# Patient Record
Sex: MALE | Race: WHITE | NOT HISPANIC OR LATINO | Employment: FULL TIME | URBAN - METROPOLITAN AREA
[De-identification: names, ages, dates, MRNs, and addresses within clinical notes are randomized per-mention and may not be internally consistent; named-entity substitution may affect disease eponyms.]

---

## 2020-03-05 ENCOUNTER — TRANSFERRED RECORDS (OUTPATIENT)
Dept: HEALTH INFORMATION MANAGEMENT | Facility: CLINIC | Age: 65
End: 2020-03-05

## 2021-12-17 ENCOUNTER — MEDICAL CORRESPONDENCE (OUTPATIENT)
Dept: HEALTH INFORMATION MANAGEMENT | Facility: CLINIC | Age: 66
End: 2021-12-17
Payer: COMMERCIAL

## 2021-12-17 ENCOUNTER — TRANSFERRED RECORDS (OUTPATIENT)
Dept: HEALTH INFORMATION MANAGEMENT | Facility: CLINIC | Age: 66
End: 2021-12-17
Payer: COMMERCIAL

## 2021-12-20 ENCOUNTER — TRANSCRIBE ORDERS (OUTPATIENT)
Dept: OTHER | Age: 66
End: 2021-12-20
Payer: COMMERCIAL

## 2021-12-20 DIAGNOSIS — Z98.890 HISTORY OF PTERYGIUM EXCISION: ICD-10-CM

## 2021-12-20 DIAGNOSIS — H53.2 BINOCULAR VISION DISORDER WITH DIPLOPIA: Primary | ICD-10-CM

## 2022-02-07 ENCOUNTER — OFFICE VISIT (OUTPATIENT)
Dept: OPHTHALMOLOGY | Facility: CLINIC | Age: 67
End: 2022-02-07
Attending: OPHTHALMOLOGY
Payer: COMMERCIAL

## 2022-02-07 DIAGNOSIS — H49.21 6TH NERVE PALSY, RIGHT: Primary | ICD-10-CM

## 2022-02-07 DIAGNOSIS — H53.10 SUBJECTIVE VISUAL DISTURBANCE: Primary | ICD-10-CM

## 2022-02-07 DIAGNOSIS — H53.2 BINOCULAR VISION DISORDER WITH DIPLOPIA: ICD-10-CM

## 2022-02-07 DIAGNOSIS — Z98.890 HISTORY OF PTERYGIUM EXCISION: ICD-10-CM

## 2022-02-07 PROCEDURE — G0463 HOSPITAL OUTPT CLINIC VISIT: HCPCS | Performed by: TECHNICIAN/TECHNOLOGIST

## 2022-02-07 PROCEDURE — 92060 SENSORIMOTOR EXAMINATION: CPT | Performed by: OPHTHALMOLOGY

## 2022-02-07 PROCEDURE — 99204 OFFICE O/P NEW MOD 45 MIN: CPT | Mod: GC | Performed by: OPHTHALMOLOGY

## 2022-02-07 ASSESSMENT — CUP TO DISC RATIO
OD_RATIO: 0.1
OS_RATIO: 0.1

## 2022-02-07 ASSESSMENT — CONF VISUAL FIELD
OD_NORMAL: 1
OS_NORMAL: 1
METHOD: COUNTING FINGERS

## 2022-02-07 ASSESSMENT — EXTERNAL EXAM - LEFT EYE: OS_EXAM: NORMAL

## 2022-02-07 ASSESSMENT — VISUAL ACUITY
METHOD: SNELLEN - LINEAR
OD_SC+: -3
OS_SC+: -1
OS_SC: 20/20
OD_SC: 20/25

## 2022-02-07 ASSESSMENT — TONOMETRY
OS_IOP_MMHG: 16
OD_IOP_MMHG: 17
IOP_METHOD: ICARE

## 2022-02-07 ASSESSMENT — EXTERNAL EXAM - RIGHT EYE: OD_EXAM: NORMAL

## 2022-02-07 ASSESSMENT — SLIT LAMP EXAM - LIDS
COMMENTS: NORMAL
COMMENTS: NORMAL

## 2022-02-07 NOTE — LETTER
2022         RE:  :  MRN: Ernst Aleman  1955  [id number]     Dear Dr. Earzo,    Thank you for asking me to see your very pleasant patient, Ernst Aleman, in neuro-ophthalmic consultation.  I would like to thank you for sending your records and I have summarized them in the history of present illness.  My assessment and plan are below.  For further details, please see my attached clinic note.      Assessment & Plan     Ernst Aleman is a 66 year old male with the following diagnoses:   1. 6th nerve palsy, right    2. Binocular vision disorder with diplopia    3. History of pterygium excision       Patient was sent for consultation by Dr. Shon Erazo for diplopia.      HPI:  Horizontal diplopia at distance x1 year. Thinks it is stable since then, no better or worse. No other neurologic symptoms around the time of onset. Denies headaches.     Pterygium resection x2 in the right eye and x1 in the left eye. 2-3 years ago. No diplopia immediately after pterygium resection, began 6 months to one year after procedures.     Meds: takes lisinopril, a statin, and metformin, vitamins    Independent historians:  Patient    Review of outside testing:  Reviewed outside eye exam    My interpretation performed today of outside testing:  None.     Review of outside clinical notes:  Dr. Shon Erazo clinic notes   21    Past medical history:  HTN, HLD, DMII, smoking    Family history  Denies any family history of AMD and glaucoma    Social history:  1-2 beers per night.    Exam: Visual acuity 20/25 right eye 20/20 left eye.  Color vision 11/11 right eye and 11/11 left eye.  Pupils ERRL  Intraocular pressure 17 right eye and 16 left eye.  Anterior segment exam bilateral corneal scarring.  Fundus exam normal but small C/D.  Strabismus exam incomitant ET with left hyper in right gaze. Abducting nystagmus of right eye.    Tests ordered and interpreted today: Sensorimotor as above    Discussion of management  / interpretation with another provider: None    Assessment/Plan: It is my impression that patient has an esotropia and his eye movements along with strabismus measurements mapped out to a right 6th nerve palsy.  This appears to be neurologically isolated.  He denies a personal history of cancer.  Interestingly his double vision has been going on for at least a year without change according to the patient.  I will obtain MRI to rule out compressive etiology.  If MRI normal will have the patient return in 4 months to reassess for stability.  I have asked him to call me should his symptoms worsen.    Again, thank you for allowing me to participate in the care of your patient.      Sincerely,    Shon Garrett MD  Professor  Ophthalmology Residency   Director of Neuro-Ophthalmology  [facility] Endowed Chair  Departments of Ophthalmology, Neurology, and Neurosurgery  [address]  [address]  T - [phone]  F - [phone]  [email or web address]      CC: DEBBIE CRUZ  [place] [address] [address] [facility] [address]  Via Fax: [phone]     Shon Erazo  Ophthalmology [place] [address] [facility] [address]  Via Fax: [phone]    DX = 6th nerve palsy

## 2022-02-07 NOTE — Clinical Note
2/7/2022       RE: Ernst Aleman  [address] [place] [address]     Dear Colleague,    Thank you for referring your patient, Ernst Aleman, to the [facility] at [facility]. Please see a copy of my visit note below.         Assessment & Plan     Ernst Aleman is a 66 year old male with the following diagnoses:   1. 6th nerve palsy, right    2. Binocular vision disorder with diplopia    3. History of pterygium excision       Patient was sent for consultation by Dr. Shon Erazo for diplopia.      HPI:  Horizontal diplopia at distance x1 year. Thinks it is stable since then, no better or worse. No other neurologic symptoms around the time of onset. Denies headaches.     Pterygium resection x2 in the right eye and x1 in the left eye. 2-3 years ago. No diplopia immediately after pterygium resection, began 6 months to one year after procedures.     Meds: takes lisinopril, a statin, and metformin, vitamins    Independent historians:  Patient    Review of outside testing:  Reviewed outside eye exam    My interpretation performed today of outside testing:  None.     Review of outside clinical notes:  Dr. Shon Erazo clinic notes   12/17/21    Past medical history:  HTN, HLD, DMII, smoking    Family history  Denies any family history of AMD and glaucoma    Social history:  1-2 beers per night.    Exam:  Visual acuity 20/25 right eye 20/20 left eye.  Color vision 11/11 right eye and 11/11 left eye.  Pupils ERRL  Intraocular pressure 17 right eye and 16 left eye.  Anterior segment exam bilateral corneal scarring.  Fundus exam normal but small C/D.  Strabismus exam incomitant ET with left hyper in right gaze. Abducting nystagmus of right eye.    Tests ordered and interpreted today:  Sensorimotor as above    Discussion of management / interpretation with another provider:   None    Assessment/Plan:   It is my impression that patient has  an esotropia and his eye movements along with strabismus measurements mapped out to a right 6th nerve palsy.  This appears to be neurologically isolated.  He denies a personal history of cancer.  Interestingly his double vision has been going on for at least a year without change according to the patient.  I will obtain MRI to rule out compressive etiology.  If MRI normal will have the patient return in 4 months to reassess for stability.  I have asked him to call me should his symptoms worsen.               Jerome Mondragon, DO   PGY-5 Neuro-Ophthalmology Fellow     Attending Physician Attestation:  Complete documentation of historical and exam elements from today's encounter can be found in the full encounter summary report (not reduplicated in this progress note).  I personally obtained the chief complaint(s) and history of present illness.  I confirmed and edited as necessary the review of systems, past medical/surgical history, family history, social history, and examination findings as documented by others; and I examined the patient myself.  I personally reviewed the relevant tests, images, and reports as documented above.  I formulated and edited as necessary the assessment and plan and discussed the findings and management plan with the patient and family. I personally reviewed the ophthalmic test(s) associated with this encounter, agree with the interpretation(s) as documented by the resident/fellow, and have edited the corresponding report(s) as necessary.  - Shon Garrett MD              Again, thank you for allowing me to participate in the care of your patient.      Sincerely,    Shon Garrett MD

## 2022-02-07 NOTE — PROGRESS NOTES
Assessment & Plan     Ernst Aleman is a 66 year old male with the following diagnoses:   1. 6th nerve palsy, right    2. Binocular vision disorder with diplopia    3. History of pterygium excision       Patient was sent for consultation by Dr. Shon Erazo for diplopia.      HPI:  Horizontal diplopia at distance x1 year. Thinks it is stable since then, no better or worse. No other neurologic symptoms around the time of onset. Denies headaches.     Pterygium resection x2 in the right eye and x1 in the left eye. 2-3 years ago. No diplopia immediately after pterygium resection, began 6 months to one year after procedures.     Meds: takes lisinopril, a statin, and metformin, vitamins    Independent historians:  Patient    Review of outside testing:  Reviewed outside eye exam    My interpretation performed today of outside testing:  None.     Review of outside clinical notes:  Dr. Shon Erazo clinic notes   12/17/21    Past medical history:  HTN, HLD, DMII, smoking    Family history  Denies any family history of AMD and glaucoma    Social history:  1-2 beers per night.    Exam:  Visual acuity 20/25 right eye 20/20 left eye.  Color vision 11/11 right eye and 11/11 left eye.  Pupils ERRL  Intraocular pressure 17 right eye and 16 left eye.  Anterior segment exam bilateral corneal scarring.  Fundus exam normal but small C/D.  Strabismus exam incomitant ET with left hyper in right gaze. Abducting nystagmus of right eye.    Tests ordered and interpreted today:  Sensorimotor as above    Discussion of management / interpretation with another provider:   None    Assessment/Plan:   It is my impression that patient has an esotropia and his eye movements along with strabismus measurements mapped out to a right 6th nerve palsy.  This appears to be neurologically isolated.  He denies a personal history of cancer.  Interestingly his double vision has been going on for at least a year without change according to the patient.   I will obtain MRI to rule out compressive etiology.  If MRI normal will have the patient return in 4 months to reassess for stability.  I have asked him to call me should his symptoms worsen.               Jerome Mondragon,    PGY-5 Neuro-Ophthalmology Fellow     Attending Physician Attestation:  Complete documentation of historical and exam elements from today's encounter can be found in the full encounter summary report (not reduplicated in this progress note).  I personally obtained the chief complaint(s) and history of present illness.  I confirmed and edited as necessary the review of systems, past medical/surgical history, family history, social history, and examination findings as documented by others; and I examined the patient myself.  I personally reviewed the relevant tests, images, and reports as documented above.  I formulated and edited as necessary the assessment and plan and discussed the findings and management plan with the patient and family. I personally reviewed the ophthalmic test(s) associated with this encounter, agree with the interpretation(s) as documented by the resident/fellow, and have edited the corresponding report(s) as necessary.  - Shon Garrett MD

## 2022-02-08 ENCOUNTER — TELEPHONE (OUTPATIENT)
Dept: OPHTHALMOLOGY | Facility: CLINIC | Age: 67
End: 2022-02-08
Payer: COMMERCIAL

## 2022-02-08 NOTE — TELEPHONE ENCOUNTER
Received call from Jenny at [facility] asking if we obtain authorization.  We no longer have a  Team that obtains authorization so will fax last chart notes to them so they can obtain authorization.     Pao Camargo on 2/8/2022 at 4:35 PM

## 2022-02-25 ENCOUNTER — TRANSFERRED RECORDS (OUTPATIENT)
Dept: HEALTH INFORMATION MANAGEMENT | Facility: CLINIC | Age: 67
End: 2022-02-25
Payer: COMMERCIAL

## 2022-02-25 LAB
CREATININE (EXTERNAL): 0.9 MG/DL (ref 0.7–1.3)
GFR ESTIMATED (EXTERNAL): >=60 ML/MIN (ref 60–130)

## 2022-03-01 ENCOUNTER — TELEPHONE (OUTPATIENT)
Dept: OPHTHALMOLOGY | Facility: CLINIC | Age: 67
End: 2022-03-01
Payer: COMMERCIAL

## 2022-03-01 NOTE — TELEPHONE ENCOUNTER
Requested patient's imaging again from [facility] as we have not received.  They stated they pushed yesterday and would not allow us to push again.  As we have not received asked them to mail a CD.  Will keep eye out for CD and forward to provider once has been loaded into PACS.      Pao Camargo on 3/1/2022 at 4:26 PM

## 2022-03-07 ENCOUNTER — DOCUMENTATION ONLY (OUTPATIENT)
Dept: OPHTHALMOLOGY | Facility: CLINIC | Age: 67
End: 2022-03-07
Payer: COMMERCIAL

## 2022-03-07 NOTE — PROGRESS NOTES
Patient's MRI has been uploaded into PACS and pasted below.  Forwarded Dr. Iqbal to review and someone will reach out to patient with results after.              Pao Camargo on 3/7/2022 at 2:06 PM

## 2022-03-08 ENCOUNTER — TELEPHONE (OUTPATIENT)
Dept: OPHTHALMOLOGY | Facility: CLINIC | Age: 67
End: 2022-03-08
Payer: COMMERCIAL

## 2022-03-08 NOTE — TELEPHONE ENCOUNTER
Called and spoke to Greg     Made him an appointment for 5/4 @ 1230 pm     Mailed out a reminder letter     Divina Santiago Communication Facilitator on 3/8/2022 at 1:34 PM

## 2022-03-08 NOTE — CONFIDENTIAL NOTE
Discussed with patient MRI results. Patient would like to follow up for strabismus surgical evaluation. Will schedule for next available with Dr. Zuniga.    Alexa Iqbal MD  PGY-3 Resident Physician  Department of Ophthalmology

## 2022-05-04 ENCOUNTER — OFFICE VISIT (OUTPATIENT)
Dept: OPHTHALMOLOGY | Facility: CLINIC | Age: 67
End: 2022-05-04
Attending: OPHTHALMOLOGY
Payer: COMMERCIAL

## 2022-05-04 DIAGNOSIS — Z98.890 HISTORY OF PTERYGIUM EXCISION: ICD-10-CM

## 2022-05-04 DIAGNOSIS — H11.061 RECURRENT PTERYGIUM OF RIGHT EYE: ICD-10-CM

## 2022-05-04 DIAGNOSIS — H53.2 DOUBLE VISION: Primary | ICD-10-CM

## 2022-05-04 DIAGNOSIS — H53.2 DOUBLE VISION: ICD-10-CM

## 2022-05-04 DIAGNOSIS — Z98.890 HISTORY OF PTERYGIUM EXCISION: Primary | ICD-10-CM

## 2022-05-04 DIAGNOSIS — H50.00 ESOTROPIA, MONOCULAR: ICD-10-CM

## 2022-05-04 DIAGNOSIS — H53.10 SUBJECTIVE VISUAL DISTURBANCE: ICD-10-CM

## 2022-05-04 DIAGNOSIS — H50.89 RESTRICTIVE STRABISMUS: ICD-10-CM

## 2022-05-04 PROCEDURE — 999N000103 HC STATISTIC NO CHARGE FACILITY FEE

## 2022-05-04 PROCEDURE — 92060 SENSORIMOTOR EXAMINATION: CPT | Performed by: OPHTHALMOLOGY

## 2022-05-04 PROCEDURE — 99214 OFFICE O/P EST MOD 30 MIN: CPT | Performed by: OPHTHALMOLOGY

## 2022-05-04 PROCEDURE — 99215 OFFICE O/P EST HI 40 MIN: CPT | Mod: 25 | Performed by: OPHTHALMOLOGY

## 2022-05-04 PROCEDURE — G0463 HOSPITAL OUTPT CLINIC VISIT: HCPCS | Mod: 25

## 2022-05-04 PROCEDURE — G0463 HOSPITAL OUTPT CLINIC VISIT: HCPCS

## 2022-05-04 RX ORDER — LISINOPRIL AND HYDROCHLOROTHIAZIDE 12.5; 2 MG/1; MG/1
1 TABLET ORAL DAILY
COMMUNITY
Start: 2022-03-02

## 2022-05-04 RX ORDER — METFORMIN HCL 500 MG
500 TABLET, EXTENDED RELEASE 24 HR ORAL DAILY
COMMUNITY
Start: 2022-02-21

## 2022-05-04 RX ORDER — CEPHALEXIN 250 MG/1
CAPSULE ORAL
COMMUNITY
Start: 2022-05-02

## 2022-05-04 RX ORDER — ATORVASTATIN CALCIUM 20 MG/1
20 TABLET, FILM COATED ORAL AT BEDTIME
COMMUNITY
Start: 2022-04-04

## 2022-05-04 RX ORDER — ALBUTEROL SULFATE 90 UG/1
AEROSOL, METERED RESPIRATORY (INHALATION)
COMMUNITY
Start: 2022-05-02

## 2022-05-04 ASSESSMENT — VISUAL ACUITY
OD_PH_SC+: -2
OS_SC+: -2
OD_PH_SC: 20/20
OD_SC+: -2
METHOD: SNELLEN - LINEAR
OS_SC: 20/20
OD_PH_SC+: -2
OD_SC: 20/25
OS_SC: 20/20
OD_PH_SC: 20/20
METHOD: SNELLEN - LINEAR
OS_SC+: -2
OD_SC: 20/25
OD_SC+: -2

## 2022-05-04 ASSESSMENT — TONOMETRY
OD_IOP_MMHG: 14
OD_IOP_MMHG: 14
IOP_METHOD: ICARE
OS_IOP_MMHG: 12
OS_IOP_MMHG: 12
IOP_METHOD: ICARE

## 2022-05-04 ASSESSMENT — CONF VISUAL FIELD
OD_NORMAL: 1
METHOD: COUNTING FINGERS
OS_NORMAL: 1
OD_NORMAL: 1
OS_NORMAL: 1
METHOD: COUNTING FINGERS

## 2022-05-04 ASSESSMENT — EXTERNAL EXAM - LEFT EYE
OS_EXAM: NORMAL
OS_EXAM: NORMAL

## 2022-05-04 ASSESSMENT — SLIT LAMP EXAM - LIDS
COMMENTS: NORMAL

## 2022-05-04 ASSESSMENT — EXTERNAL EXAM - RIGHT EYE
OD_EXAM: NORMAL
OD_EXAM: NORMAL

## 2022-05-04 NOTE — PROGRESS NOTES
"     1. 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  History of recurrent pterygium excision in the right eye (two surgeries) and one pterygium surgery in the left eye     Reviewing this case carefully the patient described horizontal binocular diplopia worse in right gaze developing about 6 to 12 months following pterygium excision in both eyes back in late 2018.  He then underwent an excision of scar tissue in the right nasal canthus region described as a recurrent pterygium and his double vision resolved for 6 to 12 months before recurring.  Today he shows evidence of significant symblepharon formation between the medial canthal region and the globe and I believe this in the context of his negative neuroimaging and strongly suggestive history indicate the cause for his strabismus is restriction of the nasal globe secondary to ocular surface symblepharon / scar formation.  I discussed the case with Dr. Ma today who agreed to see the patient and will add him on for surgery evaluation today.      History of horizontal binocular diplopia over the past 1 year.  Patient previously underwent pterygium resection twice in the right eye and once in the left eye.  This occurred 2 to 3 years ago.  He first noticed double vision around 6 months to 1 year after his procedures.    Patient reports he underwent cataract extraction in Nov / December 2018.  Before his cataract extraction he underwent sequential pterygium removals first in 1 eye then several weeks later the other eye.  This was then followed by cataract surgery.  His double vision with images horizontal to 1 another began around 6 months to 12 months after cataract surgery.  His physicians then performed a surgery in the right eye before March 2020 to remove \"scar tissue\" related to his prior pterygium surgery.  He reports that his double vision resolved afterwards but then " recurred 6 months later.    Pt states diplopia at a distance. When driving 'needs to close one eye. 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 States has eye glasses with prism which don't really work.     Outside notes indicate patient had recurrent pterygium surgery with Dr. Shon Lott.    MRI brain with and without IV contrast February 25, 2022  Negative for a cause of right 6th nerve palsy per radiology.    I reviewed these images today and agree with the interpretation.     Note from Dr. Shon Garrett from February 7, 2022 was reviewed in detail    Past medical history:  HTN, HLD, DMII, smoking     Family history  Denies any family history of AMD and glaucoma     Social history:  1-2 beers per night.    Sensorimotor exam is stable today and demonstrates a 16 prism diopter esotropia in primary gaze that increases to 25 prism diopters in right gaze and decreases in left gaze to 6 prism diopters.  There is a -1 abduction deficit in the right eye and full motility in the left.  His sensorimotor exam today is unchanged from his most recent visit with Dr. Shon Garrett on 507 2022.         39 minutes were spent on the date of the encounter by me doing chart review, history and exam, documentation, and further activities as noted above    Complete documentation of historical and exam elements from today's encounter can be found in the full encounter summary report (not reduplicated in this progress note).  I personally obtained the chief complaint(s) and history of present illness.  I confirmed and edited as necessary the review of systems, past medical/surgical history, family history, social history, and examination findings as documented by others; and I examined the patient myself.  I personally reviewed the relevant tests, images, and reports as documented  above.  I formulated and edited as necessary the assessment and plan and discussed the findings and management plan with the patient and family     Preston Zuniga MD

## 2022-05-04 NOTE — NURSING NOTE
Chief Complaints and History of Present Illnesses   Patient presents with     Diplopia Evaluation     Chief Complaint(s) and History of Present Illness(es)     Diplopia Evaluation     Frequency: intermittently    Timing: when looking in the distance    Associated symptoms: head tilt.  Negative for eye pain, blurred vision and headaches    Treatments tried: glasses    Pain scale: 0/10              Comments     Pt states diplopia at a distance. When driving 'needs to close one eye. 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  States has eye glasses with prism which don't really work.  Pterygium removed from BE before cataract surgery in 2019. Told by surgeon that pterygium needed to be removed even though they weren't bothering patient. He denies that his eyes were tender, or that they were interfering with his vision. Pt states that post cataract surgery he developed diplopia about 6 to 8 month to follow. Then the surgeon removed some scaring where pterygium was on RE thinking that was causing the diplopia, but diplopia never resolved.  Di Alfaro, SOHAIL COT 12:21 PM 05/04/2022

## 2022-05-04 NOTE — LETTER
May 4, 2022    RE: Ernst Aleman  : 1955  MRN: [id number]    Dear Providers,    I saw our mutual patient, Ernst Aleman, in follow-up in my clinic recently.  After a thorough neuro-ophthalmic history and examination, I came to the following conclusions:     1. 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  History of recurrent pterygium excision in the right eye (two surgeries) and one pterygium surgery in the left eye     Reviewing this case carefully the patient described horizontal binocular diplopia worse in right gaze developing about 6 to 12 months following pterygium excision in both eyes back in late 2018.  He then underwent an excision of scar tissue in the right nasal canthus region described as a recurrent pterygium and his double vision resolved for 6 to 12 months before recurring.  Today he shows evidence of significant symblepharon formation between the medial canthal region and the globe and I believe this in the context of his negative neuroimaging and strongly suggestive history indicate the cause for his strabismus is restriction of the nasal globe secondary to ocular surface symblepharon / scar formation.  I discussed the case with Dr. Ma today who agreed to see the patient and will add him on for surgery evaluation today.    History of horizontal binocular diplopia over the past 1 year.  Patient previously underwent pterygium resection twice in the right eye and once in the left eye.  This occurred 2 to 3 years ago.  He first noticed double vision around 6 months to 1 year after his procedures.    Patient reports he underwent cataract extraction in .  Before his cataract extraction he underwent sequential pterygium removals first in 1 eye then several weeks later the other eye.  This was then followed by cataract surgery.  His double vision with images horizontal to 1  "another began around 6 months to 12 months after cataract surgery.  His physicians then performed a surgery in the right eye before March 2020 to remove \"scar tissue\" related to his prior pterygium surgery.  He reports that his double vision resolved afterwards but then recurred 6 months later.    Pt states diplopia at a distance. When driving 'needs to close one eye. 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 States has eye glasses with prism which don't really work.   Outside notes indicate patient had recurrent pterygium surgery with Dr. Shon Lott.    MRI brain with and without IV contrast February 25, 2022  Negative for a cause of right 6th nerve palsy per radiology.    I reviewed these images today and agree with the interpretation.     Note from Dr. Shon Garrett from February 7, 2022 was reviewed in detail    Past medical history:  HTN, HLD, DMII, smoking     Family history  Denies any family history of AMD and glaucoma     Social history:  1-2 beers per night.    Sensorimotor exam is stable today and demonstrates a 16 prism diopter esotropia in primary gaze that increases to 25 prism diopters in right gaze and decreases in left gaze to 6 prism diopters.  There is a -1 abduction deficit in the right eye and full motility in the left.  His sensorimotor exam today is unchanged from his most recent visit with Dr. Shon Garrett on 507 2022.    For further exam details, please feel free to contact our office for additional records.  If you wish to contact me regarding this patient please email me at [email or web address] or give my clinic a call to arrange a phone conversation.    Sincerely,    Preston Zuniga MD  , Neuro-Ophthalmology and Adult Strabismus Surgery  The Svetlana Headley Chair in Neuro-Ophthalmology  Department " of Ophthalmology and Visual Neurosciences  [facility]    Dx  Pterygium, restrictive esotropia

## 2022-05-04 NOTE — PROGRESS NOTES
CC: restrictive esotropia right eye    Referring Provider: Dr. Zuniga      HPI:    Ernst Aleman 67 year old White male with a history of right and left eye pterygium surgery in 2018 followed by [facility].  About a year later (~2019) he noted double vision and had another procedure performed on the right eye.  He notes that this helped for a time (about 6-8 months) but has been dealing with recurrent diplopia for the past 2+ years.  Original surgeon was Dr. Parsons in [place], [place].  He was referred eventually to Dr. Zuniga who saw pt today (5/4/22) who noted restrictive esotropia stemming from the area of the right medial rectus.  He is referred to Dr. Ma for evaluation of possible release of scar tissue.  He gets intermittent binocular diplopia that bothers him most when driving or watching television.  He can hold his head in such a way that the double goes away.      POHx:  Last eye exam: 5/4/22 (with Dr. Zuniga)    Prior eye surgery/laser:   Pterygium right eye x 2  Pterygium excision left eye   GABIIOL each eye     CTL wearer: none    Glasses: no    Family Hx of eye disease: none    Gtts Currenly Taking:  none    Allergies:  NKDA    Assessment:    # Restrictive esotropia 2/2 right eye prior nasal pterygium excision:   - plan for repeat excision of pterygium with possible conjunctival autograft, amniotic membrane, mitomycin C  - discussed risk of recurrence or persistent strabismus  - patient wishes to proceed  - continue preservative free artificial tears    # Temporal Pterygium right eye:   - monitor.    Follow up Cornea:  Post-op V,T, at next visit, call if problems sooner to clinic.      ALSO SEES:  Dr. Zuniga    Attending Physician Attestation:  Complete documentation of historical and exam elements from today's encounter can be found in the full encounter summary report (not reduplicated in this progress note).  I personally obtained the chief complaint(s) and history of present illness.  I  confirmed and edited as necessary the review of systems, past medical/surgical history, family history, social history, and examination findings as documented by others; and I examined the patient myself.  I personally reviewed the relevant tests, images, and reports as documented above.  I formulated and edited as necessary the assessment and plan and discussed the findings and management plan with the patient and family. - Tino Ma MD    I personally spent great than 40min with the patient, of which >50% of the time was spent face to face with the patient, counseling and coordinating care with the patient. We discussed the complexity of his diagnosis, the need for further information prior to proceeding with yet another surgery, and the unknown prognosis for the patient at this time. Coordinated care with Dr. Zuniga.    Tino Ma MD

## 2022-05-23 ENCOUNTER — TELEPHONE (OUTPATIENT)
Dept: OPHTHALMOLOGY | Facility: CLINIC | Age: 67
End: 2022-05-23
Payer: COMMERCIAL

## 2022-05-23 PROBLEM — H53.2 DOUBLE VISION: Status: ACTIVE | Noted: 2022-05-23

## 2022-05-23 PROBLEM — H50.89 RESTRICTIVE STRABISMUS: Status: ACTIVE | Noted: 2022-05-23

## 2022-05-23 PROBLEM — Z98.890 HISTORY OF PTERYGIUM EXCISION: Status: ACTIVE | Noted: 2022-05-23

## 2022-05-23 PROBLEM — H11.061: Status: ACTIVE | Noted: 2022-05-23

## 2022-05-23 NOTE — TELEPHONE ENCOUNTER
Patient is scheduled for surgery with Dr. Tino Ma     Spoke with: Greg     Date of Surgery: 06/09/22     Location: [facility]:  [address] [place] [id number]     H&P will be completed at: [place], [place]     COVID testing: [place], [place]       Post Op scheduled on 06/10, 06/15, 07/11, and 08/08     Surgery packet was mailed 05/24     Additional comments: Advised RN will call 1 - 3 business days prior with arrival time and instructions. Informed patient they will need an adult  and responsible adult to stay with for 24 hours following surgery

## 2022-06-01 DIAGNOSIS — Z11.59 ENCOUNTER FOR SCREENING FOR OTHER VIRAL DISEASES: Primary | ICD-10-CM

## 2022-06-07 ENCOUNTER — TRANSFERRED RECORDS (OUTPATIENT)
Dept: HEALTH INFORMATION MANAGEMENT | Facility: CLINIC | Age: 67
End: 2022-06-07
Payer: COMMERCIAL

## 2022-06-07 ENCOUNTER — ANESTHESIA EVENT (OUTPATIENT)
Dept: SURGERY | Facility: AMBULATORY SURGERY CENTER | Age: 67
End: 2022-06-07
Payer: COMMERCIAL

## 2022-06-07 DIAGNOSIS — H11.061 RECURRENT PTERYGIUM OF RIGHT EYE: Primary | ICD-10-CM

## 2022-06-07 LAB
ALBUMIN (URINE) MG/SPEC: <0.7 MG/DL
ALT SERPL-CCNC: 20 U/L (ref 4–33)
AST SERPL-CCNC: 15 U/L (ref 13–39)
CHOLESTEROL (EXTERNAL): 200 MG/DL (ref 50–200)
CREATININE (EXTERNAL): 1 MG/DL (ref 0.7–1.2)
CREATININE (URINE): 79.1 MG/DL
GFR ESTIMATED (EXTERNAL): >=60 ML/MIN (ref 60–130)
GLUCOSE (EXTERNAL): 115 MG/DL (ref 70–99)
HBA1C MFR BLD: 6.3 % (ref 4–5.6)
HDLC SERPL-MCNC: 44 MG/DL (ref 40–60)
LDL CHOLESTEROL CALCULATED (EXTERNAL): 118 MG/DL
POTASSIUM (EXTERNAL): 4.1 MMOL/L (ref 3.5–5.1)
TRIGLYCERIDES (EXTERNAL): 190 MG/DL (ref 25–150)
TSH SERPL-ACNC: 0.6 UIU/ML (ref 0.45–5.33)

## 2022-06-07 RX ORDER — OFLOXACIN 3 MG/ML
1 SOLUTION/ DROPS OPHTHALMIC 4 TIMES DAILY
Qty: 10 ML | Refills: 1 | Status: SHIPPED | OUTPATIENT
Start: 2022-06-07

## 2022-06-07 RX ORDER — PREDNISOLONE ACETATE 10 MG/ML
1 SUSPENSION/ DROPS OPHTHALMIC 4 TIMES DAILY
Qty: 10 ML | Refills: 1 | Status: SHIPPED | OUTPATIENT
Start: 2022-06-07

## 2022-06-09 ENCOUNTER — ANESTHESIA (OUTPATIENT)
Dept: SURGERY | Facility: AMBULATORY SURGERY CENTER | Age: 67
End: 2022-06-09
Payer: COMMERCIAL

## 2022-06-09 ENCOUNTER — HOSPITAL ENCOUNTER (OUTPATIENT)
Facility: AMBULATORY SURGERY CENTER | Age: 67
Discharge: HOME | End: 2022-06-09
Attending: OPHTHALMOLOGY
Payer: COMMERCIAL

## 2022-06-09 VITALS
DIASTOLIC BLOOD PRESSURE: 76 MMHG | WEIGHT: 230 LBS | SYSTOLIC BLOOD PRESSURE: 136 MMHG | TEMPERATURE: 97.9 F | BODY MASS INDEX: 36.1 KG/M2 | OXYGEN SATURATION: 94 % | RESPIRATION RATE: 16 BRPM | HEIGHT: 67 IN

## 2022-06-09 DIAGNOSIS — H11.061 RECURRENT PTERYGIUM OF RIGHT EYE: Primary | ICD-10-CM

## 2022-06-09 DIAGNOSIS — Z98.890 HISTORY OF PTERYGIUM EXCISION: ICD-10-CM

## 2022-06-09 DIAGNOSIS — H53.2 DOUBLE VISION: ICD-10-CM

## 2022-06-09 DIAGNOSIS — H50.89 RESTRICTIVE STRABISMUS: ICD-10-CM

## 2022-06-09 LAB — GLUCOSE BLDC GLUCOMTR-MCNC: 149 MG/DL (ref 70–99)

## 2022-06-09 PROCEDURE — 65782 OCULAR RECONST TRANSPLANT: CPT | Mod: RT | Performed by: OPHTHALMOLOGY

## 2022-06-09 PROCEDURE — 68340 SEPARATE EYELID ADHESIONS: CPT | Mod: RT | Performed by: OPHTHALMOLOGY

## 2022-06-09 PROCEDURE — 68340 SEPARATE EYELID ADHESIONS: CPT | Mod: RT

## 2022-06-09 PROCEDURE — 82962 GLUCOSE BLOOD TEST: CPT | Performed by: PATHOLOGY

## 2022-06-09 PROCEDURE — 65782 OCULAR RECONST TRANSPLANT: CPT | Mod: RT

## 2022-06-09 DEVICE — EYE IMP AMNIOTIC MEMBRANE 2.0X1.5CM AG-2015: Type: IMPLANTABLE DEVICE | Site: EYE | Status: FUNCTIONAL

## 2022-06-09 RX ORDER — ONDANSETRON 4 MG/1
4 TABLET, ORALLY DISINTEGRATING ORAL EVERY 30 MIN PRN
Status: DISCONTINUED | OUTPATIENT
Start: 2022-06-09 | End: 2022-06-10 | Stop reason: HOSPADM

## 2022-06-09 RX ORDER — OFLOXACIN 3 MG/ML
1 SOLUTION/ DROPS OPHTHALMIC
Status: COMPLETED | OUTPATIENT
Start: 2022-06-09 | End: 2022-06-09

## 2022-06-09 RX ORDER — FENTANYL CITRATE 50 UG/ML
25 INJECTION, SOLUTION INTRAMUSCULAR; INTRAVENOUS EVERY 5 MIN PRN
Status: DISCONTINUED | OUTPATIENT
Start: 2022-06-09 | End: 2022-06-09 | Stop reason: HOSPADM

## 2022-06-09 RX ORDER — LABETALOL HYDROCHLORIDE 5 MG/ML
10 INJECTION, SOLUTION INTRAVENOUS
Status: DISCONTINUED | OUTPATIENT
Start: 2022-06-09 | End: 2022-06-09 | Stop reason: HOSPADM

## 2022-06-09 RX ORDER — BALANCED SALT SOLUTION 6.4; .75; .48; .3; 3.9; 1.7 MG/ML; MG/ML; MG/ML; MG/ML; MG/ML; MG/ML
SOLUTION OPHTHALMIC PRN
Status: DISCONTINUED | OUTPATIENT
Start: 2022-06-09 | End: 2022-06-09 | Stop reason: HOSPADM

## 2022-06-09 RX ORDER — SODIUM CHLORIDE, SODIUM LACTATE, POTASSIUM CHLORIDE, CALCIUM CHLORIDE 600; 310; 30; 20 MG/100ML; MG/100ML; MG/100ML; MG/100ML
INJECTION, SOLUTION INTRAVENOUS CONTINUOUS
Status: DISCONTINUED | OUTPATIENT
Start: 2022-06-09 | End: 2022-06-10 | Stop reason: HOSPADM

## 2022-06-09 RX ORDER — ACETAMINOPHEN 325 MG/1
975 TABLET ORAL ONCE
Status: COMPLETED | OUTPATIENT
Start: 2022-06-09 | End: 2022-06-09

## 2022-06-09 RX ORDER — LIDOCAINE 40 MG/G
CREAM TOPICAL
Status: DISCONTINUED | OUTPATIENT
Start: 2022-06-09 | End: 2022-06-09 | Stop reason: HOSPADM

## 2022-06-09 RX ORDER — MEPERIDINE HYDROCHLORIDE 25 MG/ML
12.5 INJECTION INTRAMUSCULAR; INTRAVENOUS; SUBCUTANEOUS
Status: DISCONTINUED | OUTPATIENT
Start: 2022-06-09 | End: 2022-06-10 | Stop reason: HOSPADM

## 2022-06-09 RX ORDER — HYDROMORPHONE HYDROCHLORIDE 1 MG/ML
0.2 INJECTION, SOLUTION INTRAMUSCULAR; INTRAVENOUS; SUBCUTANEOUS EVERY 5 MIN PRN
Status: DISCONTINUED | OUTPATIENT
Start: 2022-06-09 | End: 2022-06-09 | Stop reason: HOSPADM

## 2022-06-09 RX ORDER — OXYCODONE HYDROCHLORIDE 5 MG/1
5 TABLET ORAL EVERY 4 HOURS PRN
Status: DISCONTINUED | OUTPATIENT
Start: 2022-06-09 | End: 2022-06-10 | Stop reason: HOSPADM

## 2022-06-09 RX ORDER — LIDOCAINE HYDROCHLORIDE 20 MG/ML
INJECTION, SOLUTION INFILTRATION; PERINEURAL PRN
Status: DISCONTINUED | OUTPATIENT
Start: 2022-06-09 | End: 2022-06-09

## 2022-06-09 RX ORDER — SODIUM CHLORIDE, SODIUM LACTATE, POTASSIUM CHLORIDE, CALCIUM CHLORIDE 600; 310; 30; 20 MG/100ML; MG/100ML; MG/100ML; MG/100ML
INJECTION, SOLUTION INTRAVENOUS CONTINUOUS
Status: DISCONTINUED | OUTPATIENT
Start: 2022-06-09 | End: 2022-06-09 | Stop reason: HOSPADM

## 2022-06-09 RX ORDER — PROPOFOL 10 MG/ML
INJECTION, EMULSION INTRAVENOUS PRN
Status: DISCONTINUED | OUTPATIENT
Start: 2022-06-09 | End: 2022-06-09

## 2022-06-09 RX ORDER — ONDANSETRON 2 MG/ML
4 INJECTION INTRAMUSCULAR; INTRAVENOUS EVERY 30 MIN PRN
Status: DISCONTINUED | OUTPATIENT
Start: 2022-06-09 | End: 2022-06-10 | Stop reason: HOSPADM

## 2022-06-09 RX ORDER — FENTANYL CITRATE 50 UG/ML
25 INJECTION, SOLUTION INTRAMUSCULAR; INTRAVENOUS
Status: DISCONTINUED | OUTPATIENT
Start: 2022-06-09 | End: 2022-06-10 | Stop reason: HOSPADM

## 2022-06-09 RX ORDER — PROPARACAINE HYDROCHLORIDE 5 MG/ML
1 SOLUTION/ DROPS OPHTHALMIC ONCE
Status: COMPLETED | OUTPATIENT
Start: 2022-06-09 | End: 2022-06-09

## 2022-06-09 RX ORDER — LIDOCAINE HYDROCHLORIDE AND EPINEPHRINE 10; 10 MG/ML; UG/ML
INJECTION, SOLUTION INFILTRATION; PERINEURAL PRN
Status: DISCONTINUED | OUTPATIENT
Start: 2022-06-09 | End: 2022-06-09 | Stop reason: HOSPADM

## 2022-06-09 RX ADMIN — OFLOXACIN 1 DROP: 3 SOLUTION/ DROPS OPHTHALMIC at 07:51

## 2022-06-09 RX ADMIN — SODIUM CHLORIDE, SODIUM LACTATE, POTASSIUM CHLORIDE, CALCIUM CHLORIDE: 600; 310; 30; 20 INJECTION, SOLUTION INTRAVENOUS at 08:05

## 2022-06-09 RX ADMIN — LIDOCAINE HYDROCHLORIDE 80 MG: 20 INJECTION, SOLUTION INFILTRATION; PERINEURAL at 08:47

## 2022-06-09 RX ADMIN — PROPARACAINE HYDROCHLORIDE 1 DROP: 5 SOLUTION/ DROPS OPHTHALMIC at 07:47

## 2022-06-09 RX ADMIN — ACETAMINOPHEN 975 MG: 325 TABLET ORAL at 07:57

## 2022-06-09 RX ADMIN — PROPOFOL 50 MG: 10 INJECTION, EMULSION INTRAVENOUS at 08:47

## 2022-06-09 RX ADMIN — OFLOXACIN 1 DROP: 3 SOLUTION/ DROPS OPHTHALMIC at 07:47

## 2022-06-09 RX ADMIN — OFLOXACIN 1 DROP: 3 SOLUTION/ DROPS OPHTHALMIC at 07:56

## 2022-06-09 NOTE — ANESTHESIA PREPROCEDURE EVALUATION
Anesthesia Pre-Procedure Evaluation    Patient: Ernst Aleman   MRN: [id number] : 1955        Procedure : Procedure(s):  RECURRENT PTERYGIUM EXCISION, POSSIBLE CONJUNCTIVAL AUTOGRAFT TRANSPLANT, POSSIBLE AMNIOTIC MEMBRANE, POSSIBLE MITOMYCIN C - RIGHT EYE          Past Medical History:   Diagnosis Date     Essential hypertension      Mixed hyperlipidemia      Tobacco abuse      Type 2 diabetes mellitus (H)       History reviewed. No pertinent surgical history.   No Known Allergies   Social History     Tobacco Use     Smoking status: Current Some Day Smoker     Smokeless tobacco: Never Used   Substance Use Topics     Alcohol use: Yes     Comment: occasionally      Wt Readings from Last 1 Encounters:   22 104.3 kg (230 lb)        Anesthesia Evaluation            ROS/MED HX  ENT/Pulmonary:       Neurologic:       Cardiovascular:     (+) hypertension-----    METS/Exercise Tolerance:     Hematologic:       Musculoskeletal:       GI/Hepatic:       Renal/Genitourinary:       Endo:     (+) type II DM, Not using insulin, - not using insulin pump.  (-) Type I DM   Psychiatric/Substance Use:       Infectious Disease:       Malignancy:       Other:               OUTSIDE LABS:  CBC: No results found for: WBC, HGB, HCT, PLT  BMP: No results found for: NA, POTASSIUM, CHLORIDE, CO2, BUN, CR, GLC  COAGS: No results found for: PTT, INR, FIBR  POC: No results found for: BGM, HCG, HCGS  HEPATIC: No results found for: ALBUMIN, PROTTOTAL, ALT, AST, GGT, ALKPHOS, BILITOTAL, BILIDIRECT, MIGNON  OTHER: No results found for: PH, LACT, A1C, OLGA, PHOS, MAG, LIPASE, AMYLASE, TSH, T4, T3, CRP, SED    Anesthesia Plan    ASA Status:  2      Anesthesia Type: MAC.     - Reason for MAC: immobility needed   Induction: Intravenous.   Maintenance: Balanced.        Consents    Anesthesia Plan(s) and associated risks, benefits, and realistic alternatives discussed. Questions answered and patient/representative(s) expressed  understanding.     - Discussed: Risks, Benefits and Alternatives for BOTH SEDATION and the PROCEDURE were discussed     - Discussed with:  Patient      - Extended Intubation/Ventilatory Support Discussed: No.      - Patient is DNR/DNI Status: No    Use of blood products discussed: No .     Postoperative Care    Pain management: Oral pain medications.        Comments:                Denny Castanon MD, MD

## 2022-06-09 NOTE — ANESTHESIA POSTPROCEDURE EVALUATION
Patient: Ernst Aleman    Procedure: Procedure(s):  RECURRENT PTERYGIUM EXCISION, POSSIBLE CONJUNCTIVAL AUTOGRAFT TRANSPLANT, POSSIBLE AMNIOTIC MEMBRANE, POSSIBLE MITOMYCIN C - RIGHT EYE       Anesthesia Type:  MAC    Note:  Disposition: Outpatient   Postop Pain Control: Uneventful            Sign Out: Well controlled pain   PONV: No   Neuro/Psych: Uneventful            Sign Out: Acceptable/Baseline neuro status   Airway/Respiratory: Uneventful            Sign Out: Acceptable/Baseline resp. status   CV/Hemodynamics: Uneventful            Sign Out: Acceptable CV status; No obvious hypovolemia; No obvious fluid overload   Other NRE: NONE   DID A NON-ROUTINE EVENT OCCUR? No           Last vitals:  Vitals Value Taken Time   /76 06/09/22 1025   Temp 36.6  C (97.9  F) 06/09/22 1025   Pulse     Resp 16 06/09/22 1025   SpO2 94 % 06/09/22 1025       Electronically Signed By: Denny Castanon MD, MD  June 9, 2022  10:32 AM

## 2022-06-09 NOTE — OP NOTE
Operative Report    Date of Operation: June 9, 2022  Pre-operative diagnosis:   1. Recurrent pterygium, right eye  2. Symblepharon, right eye  3. Restrictive strabismus, right eye  Post-operative diagnosis: Same   Procedure(s):   1. Pterygium and symblepharon excision, right  2. Application of mitomycin C (0.2 mg/ml), right eye  2. Conjunctival autograft (8mm x 8mm), right eye  3. Amniotic membrane transplantation (10 mm x 10 mm), right eye    Surgeon(s): Dr. Tino Ma  Fellow: Shon Miles DO    Findings: None   Blood Loss: None  Complications: None     Implant Name Type Inv. Item Serial No.  Lot No. LRB No. Used Action   EYE IMP AMNIOTIC MEMBRANE 2.0X1.5CM -2015 - [id number] Lens/Eye Implant EYE IMP AMNIOTIC MEMBRANE 2.0X1.5CM AG-2015 [id number] BIO-TISSUE  Right 1 Implanted       INDICATION FOR PROCEDURE   The patient was seen in our eye clinic with a history of pterygium with recurrence resulting in scarring and restrictive esotropia of the right eye that has been affecting their activities of daily living. The risks, including, but not limited to infection, loss of vision, loss of eye, need for more surgery, and bleeding, along with the benefits, alternatives, expectations, and the procedure itself were discussed at length with the patient who wished to proceed with surgery.   DESCRIPTION OF PROCEDURE   In the preoperative suite, the patient was identified, the surgical site marked and informed consent was obtained. The patient was the brought back to the operative suite where the appropriate anesthesia monitors were connected. A routine time-out was performed and a retrobulbar block using a 50:50 mixture of 2% lidocaine and 0.75% bupavicaine was administered to the right eye. The patient's operative eye was then prepped and draped in the usual sterile fashion for ophthalmic surgery.  Following draping, a lid speculum was placed to the operative eye. A marking pen was used to  denote the area for tissue excision. There was noted to be an area of scarring with tight adhesion of the conjunctiva to the scleral nasal to the recurrent pterygium where prior excision was performed.  Subconjunctival injection of lidocaine 1% with 1:100,000 epinephrine was administered in the area of the scar tissue in order to provide hemostasis and to balloon up the conjunctiva in the area. Using 0.12 forceps and sharp Jerome scissors, the scar tissue was released from its adherence to the globe using sharp and blunt dissection.  Then the scar tissue and redundant Tenon's was dissected off the overlying conjunctiva, taking care not to button hole the conjunctiva.  Then this scar tissue and redundant Tenon's were excised sharply, taking care to avoid the medial rectus muscle.  The exposed area was then measured with calipers.     The superior conjunctiva was then exposed and area 8mm x 8mm was marked using calipers and a marking to outline the conjunctival autograft. Care was taken to avoid involving the superior limbus. Next, lidocaine 1% with epinephrine 1:100,000 was again injected subconjunctivally in order to balloon up the superior conjunctiva. Using 0.12 forceps and sharp Jerome scissors, the conjunctiva was dissected off the underlying tenon's. The harvested graft was then moved over to the conjunctival defect where the pterygium was excised. The graft was then glued (stromal side down) using Tisseel fibrin glue.  Cryopreserved amniotic membrane was then brought to the surgical field and cut to a 10 mm x 10 mm size. The amniotic membrane was then placed basement membrane side down over the area of conjunctival graft harvest and glued into place using fibrin glue.   Subconjunctival injections of ancef and dexamethasone were then administered to the inferior fornix. A large diameter Kontour lens (20 mm) was then placed over the ocular surface to help maintain the position of the amniotic membrane and  conjunctival autograft and also to help with pain from the superior conjunctival defect.  The lid speculum and drapes were carefully removed.  An eye patch and an eye shield were taped over the eye. The patient was then taken to the recovery room in stable condition having tolerated the procedure well and discharged home in good condition. Patient is to follow-up next day at eye clinic for post-operative visit.  Dr. Tino Ma was scrubbed and present for the entire surgery.  Shon Miles DO  Fellow, Cornea & External Disease  Department of Ophthalmology  [facility]    Tino Ma MD   of Ophthalmology

## 2022-06-09 NOTE — DISCHARGE INSTRUCTIONS
[facility]  Home Care Following Anesthesia  For 24 hours after surgery:  Get plenty of rest.  A responsible adult must stay with you for at least 24 hours after you leave the surgery center.  Do not drive or use heavy equipment.  If you have weakness or tingling, don't drive or use heavy equipment until this feeling goes away.   Do not drink alcohol.   Avoid strenuous or risky activities.  Ask for help when climbing stairs.  You may feel lightheaded.  IF so, sit for a few minutes before standing.  Have someone help you get up.   If you have nausea (feel sick to your stomach): Drink only clear liquids such as apple juice, ginger ale, broth or 7-Up.  Rest may also help.  Be sure to drink enough fluids.  Move to a regular diet as you feel able.   You may have a slight fever.  Call the doctor if your fever is over 100 F (37.7 C) (taken under the tongue) or lasts longer than 24 hours.  You may have a dry mouth, a sore throat, muscle aches or trouble sleeping. These should go away after 24 hours.  Do not make important or legal decisions.   It is recommended to avoid smoking.               Tips for taking pain medications  To get the best pain relief possible, remember these points:  Take pain medications as directed, before pain becomes severe.  Pain medication can upset your stomach: taking it with food may help.  Constipation is a common side effect of pain medication. Drink plenty of  fluids.  Eat foods high in fiber. Take a stool softener if recommended by your doctor or pharmacist.  Do not drink alcohol, drive or operate machinery while taking pain medications.  Ask about other ways to control pain, such as with heat, ice or relaxation.    Tylenol/Acetaminophen Consumption  To help encourage the safe use of acetaminophen, the makers of TYLENOL  have lowered the maximum daily dose for single-ingredient Extra Strength TYLENOL  (acetaminophen) products sold in the [place] from 8 pills  per day (4,000 mg) to 6 pills per day (3,000 mg). The dosing interval has also changed from 2 pills every 4-6 hours to 2 pills every 6 hours.  If you feel your pain relief is insufficient, you may take Tylenol/Acetaminophen in addition to your narcotic pain medication.   Be careful not to exceed 3,000 mg of Tylenol/Acetaminophen in a 24 hour period from all sources.  If you are taking extra strength Tylenol/acetaminophen (500 mg), the maximum dose is 6 tablets in 24 hours.  If you are taking regular strength acetaminophen (325 mg), the maximum dose is 9 tablets in 24 hours.    Call a doctor for any of the following:  Signs of infection (fever, growing tenderness at the surgery site, a large amount of drainage or bleeding, severe pain, foul-smelling drainage, redness, swelling).  It has been over 8 to 10 hours since surgery and you are still not able to urinate (pass water).  Headache for over 24 hours.  Numbness, tingling or weakness the day after surgery (if you had spinal anesthesia).  Signs of Covid-19 infection (temperature over 100 degrees, shortness of breath, cough, loss of taste/smell, generalized body aches, persistent headache, chills, sore throat, nausea/vomiting/diarrhea)  Your doctor is:  Dr. Tino Ma, Ophthalmology: [phone]                    Or dial [phone] and ask for the resident on call for:  Ophthalmology  For emergency care, call the:  [facility]:  [phone] (TTY for hearing impaired: [phone])

## 2022-06-09 NOTE — ANESTHESIA CARE TRANSFER NOTE
Patient: Ernst Aleman    Procedure: Procedure(s):  RECURRENT PTERYGIUM EXCISION, POSSIBLE CONJUNCTIVAL AUTOGRAFT TRANSPLANT, POSSIBLE AMNIOTIC MEMBRANE, POSSIBLE MITOMYCIN C - RIGHT EYE       Diagnosis: History of pterygium excision [Z98.890]  Double vision [H53.2]  Restrictive strabismus [H50.89]  Recurrent pterygium of right eye [H11.061]  Diagnosis Additional Information: No value filed.    Anesthesia Type:   MAC     Note:    Oropharynx: oropharynx clear of all foreign objects  Level of Consciousness: awake  Oxygen Supplementation: room air    Independent Airway: airway patency satisfactory and stable  Dentition: dentition unchanged  Vital Signs Stable: post-procedure vital signs reviewed and stable  Report to RN Given: handoff report given  Patient transferred to: Phase II  Comments: VSS and WNL, comfortable, no PONV, report to Skip THIBODEAUX  Handoff Report: Identifed the Patient, Identified the Reponsible Provider, Reviewed the pertinent medical history, Discussed the surgical course, Reviewed Intra-OP anesthesia mangement and issues during anesthesia, Set expectations for post-procedure period and Allowed opportunity for questions and acknowledgement of understanding      Vitals:  Vitals Value Taken Time   BP     Temp     Pulse     Resp     SpO2         Electronically Signed By: JOAO Taylor CRNA  June 9, 2022  10:00 AM  
72

## 2022-06-10 ENCOUNTER — OFFICE VISIT (OUTPATIENT)
Dept: OPHTHALMOLOGY | Facility: CLINIC | Age: 67
End: 2022-06-10
Attending: OPHTHALMOLOGY
Payer: COMMERCIAL

## 2022-06-10 DIAGNOSIS — H50.89 RESTRICTIVE STRABISMUS: Primary | ICD-10-CM

## 2022-06-10 DIAGNOSIS — H11.061 RECURRENT PTERYGIUM OF RIGHT EYE: ICD-10-CM

## 2022-06-10 DIAGNOSIS — H53.2 DOUBLE VISION: ICD-10-CM

## 2022-06-10 PROCEDURE — 99024 POSTOP FOLLOW-UP VISIT: CPT | Performed by: OPHTHALMOLOGY

## 2022-06-10 PROCEDURE — G0463 HOSPITAL OUTPT CLINIC VISIT: HCPCS

## 2022-06-10 ASSESSMENT — SLIT LAMP EXAM - LIDS
COMMENTS: NORMAL
COMMENTS: NORMAL

## 2022-06-10 ASSESSMENT — TONOMETRY
OD_IOP_MMHG: 10
IOP_METHOD: ICARE
OS_IOP_MMHG: 8

## 2022-06-10 ASSESSMENT — CONF VISUAL FIELD
OS_NORMAL: 1
METHOD: COUNTING FINGERS
OD_NORMAL: 1

## 2022-06-10 ASSESSMENT — VISUAL ACUITY
OS_SC: 20/25
OS_SC+: +2
METHOD: SNELLEN - LINEAR
OD_SC: 20/30
OD_PH_SC+: -2
OD_PH_SC: 20/25

## 2022-06-10 ASSESSMENT — EXTERNAL EXAM - RIGHT EYE: OD_EXAM: NORMAL

## 2022-06-10 ASSESSMENT — EXTERNAL EXAM - LEFT EYE: OS_EXAM: NORMAL

## 2022-06-10 NOTE — PROGRESS NOTES
CC: restrictive esotropia right eye    Referring Provider: Dr. Zuniga      HPI:    Ernst Aleman 67 year old White male with a history of right and left eye pterygium surgery in 2018 followed by FRANK.  About a year later (~2019) he noted double vision and had another procedure performed on the right eye.  He notes that this helped for a time (about 6-8 months) but has been dealing with recurrent diplopia for the past 2+ years.  Original surgeon was Dr. Parsons in [place], [place].  He was referred eventually to Dr. Zuniga who saw pt today (5/4/22) who noted restrictive esotropia stemming from the area of the right medial rectus.  He is referred to Dr. Ma for evaluation of possible release of scar tissue.  He gets intermittent binocular diplopia that bothers him most when driving or watching television.  He can hold his head in such a way that the double goes away.      Interval Hx: s/p repeat excision of pterygium with conjunctival autograft, amniotic membrane, mitomycin C OD, patient states he still has some binocular diplopia in far right gaze. Patient denies pain, vision is slightly blurry. No discharge.    POHx:  Pterygium right eye x 2 (outside ophthalmologist)  Pterygium excision left eye (outside ophthalmologist)   FRANK each eye   S/p repeat excision of pterygium with conjunctival autograft, amniotic membrane, mitomycin C OD 6/9/22 (REINALDO)    Family Hx of eye disease: none    Gtts Currenly Taking:  none    Allergies:  NKDA    Assessment:    # Restrictive esotropia 2/2 right eye prior nasal pterygium excision:   S/p repeat excision of pterygium with conjunctival autograft, amniotic membrane, mitomycin C OD 6/9/22  - start prednisolone QID OS  - start ofloxacin QID OS  - continue Kontour lens  - eye shield at night  - avoid eye rubbing and water in the eye  - reviewed post-op precautions  - continue preservative free artificial tears    # Temporal Pterygium right eye:   - monitor.    Follow up  Cornea:  Post-op V,T, at next visit, call if problems sooner to clinic.      ALSO SEES:  Dr. Zuniga    Attending Physician Attestation:  Complete documentation of historical and exam elements from today's encounter can be found in the full encounter summary report (not reduplicated in this progress note).  I personally obtained the chief complaint(s) and history of present illness.  I confirmed and edited as necessary the review of systems, past medical/surgical history, family history, social history, and examination findings as documented by others; and I examined the patient myself.  I personally reviewed the relevant tests, images, and reports as documented above.  I formulated and edited as necessary the assessment and plan and discussed the findings and management plan with the patient and family. - Cuate Ma MD

## 2022-06-10 NOTE — NURSING NOTE
Chief Complaints and History of Present Illnesses   Patient presents with     Post Op (Ophthalmology) Right Eye       1 day post op right eye 6/9/2022 :   1. Pterygium and symblepharon excision, right  2. Application of mitomycin C (0.2 mg/ml), right eye  2. Conjunctival autograft (8mm x 8mm), right eye  3. Amniotic membrane transplantation (10 mm x 10 mm), right eye  Eye meds: none  Joel Benavides, [place] 6/10/2022 9:32 AM        Chief Complaint(s) and History of Present Illness(es)     Post Op (Ophthalmology) Right Eye     Laterality: right eye    Associated symptoms: Negative for eye pain, redness and tearing    Treatments tried: no treatments    Pain scale: 0/10    Comments:   1 day post op right eye 6/9/2022 :   1. Pterygium and symblepharon excision, right  2. Application of mitomycin C (0.2 mg/ml), right eye  2. Conjunctival autograft (8mm x 8mm), right eye  3. Amniotic membrane transplantation (10 mm x 10 mm), right eye  Eye meds: none  Joel Benavides, [place] 6/10/2022 9:32 AM

## 2022-06-15 ENCOUNTER — OFFICE VISIT (OUTPATIENT)
Dept: OPHTHALMOLOGY | Facility: CLINIC | Age: 67
End: 2022-06-15
Attending: OPHTHALMOLOGY
Payer: COMMERCIAL

## 2022-06-15 DIAGNOSIS — Z98.890 HISTORY OF PTERYGIUM EXCISION: Primary | ICD-10-CM

## 2022-06-15 DIAGNOSIS — H11.061 RECURRENT PTERYGIUM OF RIGHT EYE: ICD-10-CM

## 2022-06-15 PROCEDURE — 99024 POSTOP FOLLOW-UP VISIT: CPT | Performed by: OPHTHALMOLOGY

## 2022-06-15 PROCEDURE — G0463 HOSPITAL OUTPT CLINIC VISIT: HCPCS

## 2022-06-15 RX ORDER — PREDNISOLONE ACETATE 10 MG/ML
1 SUSPENSION/ DROPS OPHTHALMIC 4 TIMES DAILY
Qty: 10 ML | Refills: 0 | Status: SHIPPED | OUTPATIENT
Start: 2022-06-15

## 2022-06-15 ASSESSMENT — VISUAL ACUITY
OD_CC: 20/25
OD_CC+: +2
OS_CC: 20/20
METHOD: SNELLEN - LINEAR
OS_CC+: -3

## 2022-06-15 ASSESSMENT — CONF VISUAL FIELD
METHOD: COUNTING FINGERS
OS_NORMAL: 1
OD_NORMAL: 1

## 2022-06-15 ASSESSMENT — EXTERNAL EXAM - LEFT EYE: OS_EXAM: NORMAL

## 2022-06-15 ASSESSMENT — SLIT LAMP EXAM - LIDS
COMMENTS: NORMAL
COMMENTS: NORMAL

## 2022-06-15 ASSESSMENT — EXTERNAL EXAM - RIGHT EYE: OD_EXAM: NORMAL

## 2022-06-15 ASSESSMENT — TONOMETRY
OD_IOP_MMHG: 11
IOP_METHOD: ICARE
OS_IOP_MMHG: 8

## 2022-06-15 NOTE — PROGRESS NOTES
CC: restrictive esotropia right eye    Referring Provider: Dr. Zuniga      HPI:    Ernst Aleman 67 year old White male with a history of right and left eye pterygium surgery in 2018 followed by FRANK.  About a year later (~2019) he noted double vision and had another procedure performed on the right eye.  He notes that this helped for a time (about 6-8 months) but has been dealing with recurrent diplopia for the past 2+ years.  Original surgeon was Dr. Parsons in [place], [place].  He was referred eventually to Dr. Zuniga who saw pt today (5/4/22) who noted restrictive esotropia stemming from the area of the right medial rectus.  He is referred to Dr. Ma for evaluation of possible release of scar tissue.  He gets intermittent binocular diplopia that bothers him most when driving or watching television.  He can hold his head in such a way that the double goes away.      Interval Hx: s/p repeat excision of pterygium with conjunctival autograft, amniotic membrane, mitomycin C OD, patient states he still has some blurry vision, especially with rapidly looking to the right. Has some foreign body sensation, mostly under the lower lid. Patient denies pain. No discharge.    POHx:  Pterygium right eye x 2 (outside ophthalmologist)  Pterygium excision left eye (outside ophthalmologist)   FRANK each eye   S/p repeat excision of pterygium with conjunctival autograft, amniotic membrane, mitomycin C OD 6/9/22 (REINALDO)    Family Hx of eye disease: none    Gtts Currenly Taking:  Prednisolone QID OD  Ofloxacin QID OD    Allergies:  NKDA    Assessment:    # Restrictive esotropia 2/2 right eye prior nasal pterygium excision:   S/p repeat excision of pterygium with conjunctival autograft, amniotic membrane, mitomycin C OD 6/9/22  - Diplopia improving in far right gaze  - Continue prednisolone QID OS x 1 more week, then TID OS x 1 week, then BID x 1week, then daily x 1 week, then STOP  - Decrease ofloxacin to BID OS until empty,  then STOP  - D/C Kontour lens  - eye shield at night x 1 more week  - avoid eye rubbing and water in the eye  - reviewed post-op precautions  - continue preservative free artificial tears    # Temporal Pterygium right eye:   - monitor.    Follow up Cornea:  Post-op V,T, at next visit, call if problems sooner to clinic.    ALSO SEES:  Dr. Zuniga    Attending Physician Attestation:  Complete documentation of historical and exam elements from today's encounter can be found in the full encounter summary report (not reduplicated in this progress note).  I personally obtained the chief complaint(s) and history of present illness.  I confirmed and edited as necessary the review of systems, past medical/surgical history, family history, social history, and examination findings as documented by others; and I examined the patient myself.  I personally reviewed the relevant tests, images, and reports as documented above.  I formulated and edited as necessary the assessment and plan and discussed the findings and management plan with the patient and family. - Tino Ma MD

## 2022-06-15 NOTE — PATIENT INSTRUCTIONS
RIGHT EYE:  Prednisolone (WHITE or PINK Top) four times a day for 1 more week, then three times a day for 1 week, then twice a day for 1 week, then once a day for 1 week, then STOP    Ofloxacin (TAN Top) twice a day until the bottle is empty    Preservative free artificial tears as needed    Wear the eye shield at night for 1 more week.

## 2022-06-15 NOTE — NURSING NOTE
Chief Complaints and History of Present Illnesses   Patient presents with     Post Op (Ophthalmology) Right Eye     Chief Complaint(s) and History of Present Illness(es)     Post Op (Ophthalmology) Right Eye     Laterality: right eye    Associated symptoms: foreign body sensation (RE).  Negative for dryness, eye pain, photophobia and itching    Pain scale: 0/10              Comments     1 Week post pterygium excision RE. He says RE sometimes blurs when he turns quickly. Not sure if bandage contact is still in.     Eduardo English COT 12:14 PM Ingrid 15, 2022                 \

## 2022-07-11 ENCOUNTER — OFFICE VISIT (OUTPATIENT)
Dept: OPHTHALMOLOGY | Facility: CLINIC | Age: 67
End: 2022-07-11
Attending: OPHTHALMOLOGY
Payer: COMMERCIAL

## 2022-07-11 DIAGNOSIS — Z98.890 HISTORY OF PTERYGIUM EXCISION: ICD-10-CM

## 2022-07-11 DIAGNOSIS — H11.061 RECURRENT PTERYGIUM OF RIGHT EYE: Primary | ICD-10-CM

## 2022-07-11 PROCEDURE — G0463 HOSPITAL OUTPT CLINIC VISIT: HCPCS

## 2022-07-11 PROCEDURE — 99024 POSTOP FOLLOW-UP VISIT: CPT | Mod: GC | Performed by: OPHTHALMOLOGY

## 2022-07-11 ASSESSMENT — VISUAL ACUITY
OS_SC: 20/25
OD_SC+: +2
OD_SC: 20/30
METHOD: SNELLEN - LINEAR
OD_PH_SC: 20/20

## 2022-07-11 ASSESSMENT — EXTERNAL EXAM - RIGHT EYE: OD_EXAM: NORMAL

## 2022-07-11 ASSESSMENT — SLIT LAMP EXAM - LIDS
COMMENTS: NORMAL
COMMENTS: NORMAL

## 2022-07-11 ASSESSMENT — TONOMETRY
IOP_METHOD: ICARE
OS_IOP_MMHG: 08
OD_IOP_MMHG: 11

## 2022-07-11 ASSESSMENT — EXTERNAL EXAM - LEFT EYE: OS_EXAM: NORMAL

## 2022-07-11 NOTE — PROGRESS NOTES
"CC: restrictive esotropia right eye    Referring Provider: Dr. Zuniga      HPI:    Ernst Aleman 67 year old White male with a history of right and left eye pterygium surgery in 2018 followed by [facility].  About a year later (~2019) he noted double vision and had another procedure performed on the right eye.  He notes that this helped for a time (about 6-8 months) but has been dealing with recurrent diplopia for the past 2+ years.  Original surgeon was Dr. Parsons in [place], [place].  He was referred eventually to Dr. Zuniga who saw pt today (5/4/22) who noted restrictive esotropia stemming from the area of the right medial rectus.  He is referred to Dr. Ma for evaluation of possible release of scar tissue.  He gets intermittent binocular diplopia that bothers him most when driving or watching television.  He can hold his head in such a way that the double goes away.      Interval Hx: s/p repeat excision of pterygium with conjunctival autograft, amniotic membrane, mitomycin C right eye. Reports that when he turns his head to the right and looks far right he still experiences some double vision. This continues for a \"half second\" once turning his head back straight, but then the double vision resolves. No foreign body sensation. Patient denies pain. No discharge. No flashes, floaters, curtains.    POHx:  Pterygium right eye x 2 (outside ophthalmologist)  Pterygium excision left eye (outside ophthalmologist)   FRANK each eye   S/p repeat excision of pterygium with conjunctival autograft, amniotic membrane, mitomycin C OD 6/9/22 ([facility])    Family Hx of eye disease: none    Gtts Currenly Taking:  Prednisolone BID OD  Ofloxacin BID OD    Allergies:  NKDA    Assessment:  # Restrictive esotropia 2/2 right eye prior nasal pterygium excision:   S/p repeat excision of pterygium with conjunctival autograft, amniotic membrane, mitomycin C OD 6/9/22  - Diplopia improving in far right gaze   - Continue prednisolone taper to " daily x1 week, then STOP  - STOP ofloxacin  - reviewed post-op precautions  - continue preservative free artificial tears    # Temporal Pterygium right eye:   - monitor.    Follow up Cornea:  Post-op V,T, at next visit, call if problems sooner to clinic.    ALSO SEES:  Dr. Efrain Charles MD  Cornea Fellow  [facility]    Attending Physician Attestation:  Complete documentation of historical and exam elements from today's encounter can be found in the full encounter summary report (not reduplicated in this progress note).  I personally obtained the chief complaint(s) and history of present illness.  I confirmed and edited as necessary the review of systems, past medical/surgical history, family history, social history, and examination findings as documented by others; and I examined the patient myself.  I personally reviewed the relevant tests, images, and reports as documented above.  I formulated and edited as necessary the assessment and plan and discussed the findings and management plan with the patient and family. - Tino Ma MD

## 2022-07-11 NOTE — NURSING NOTE
Chief Complaints and History of Present Illnesses   Patient presents with     Post Op (Ophthalmology) Right Eye     Chief Complaint(s) and History of Present Illness(es)     Post Op (Ophthalmology) Right Eye     Onset: 1 month ago              Comments     S/p repeat excision of pterygium with conjunctival autograft, amniotic membrane, mitomycin C OD 6/9/22-Pt. States that he is doing well. No change in VA BE. No pain BE.  Meredith Herrera COT 12:56 PM July 11, 2022

## 2022-08-08 ENCOUNTER — OFFICE VISIT (OUTPATIENT)
Dept: OPHTHALMOLOGY | Facility: CLINIC | Age: 67
End: 2022-08-08
Attending: OPHTHALMOLOGY
Payer: COMMERCIAL

## 2022-08-08 DIAGNOSIS — H11.061 RECURRENT PTERYGIUM OF RIGHT EYE: ICD-10-CM

## 2022-08-08 DIAGNOSIS — H53.2 DOUBLE VISION: ICD-10-CM

## 2022-08-08 DIAGNOSIS — H50.89 RESTRICTIVE STRABISMUS: Primary | ICD-10-CM

## 2022-08-08 PROCEDURE — 99024 POSTOP FOLLOW-UP VISIT: CPT | Mod: GC | Performed by: OPHTHALMOLOGY

## 2022-08-08 PROCEDURE — G0463 HOSPITAL OUTPT CLINIC VISIT: HCPCS

## 2022-08-08 ASSESSMENT — VISUAL ACUITY
METHOD: SNELLEN - LINEAR
OS_SC: 20/25
OD_SC+: -2
OS_SC+: -2
OD_SC: 20/25

## 2022-08-08 ASSESSMENT — TONOMETRY
OS_IOP_MMHG: 14
OD_IOP_MMHG: 14
IOP_METHOD: ICARE

## 2022-08-08 ASSESSMENT — CONF VISUAL FIELD
METHOD: COUNTING FINGERS
OD_NORMAL: 1
OS_NORMAL: 1

## 2022-08-08 ASSESSMENT — SLIT LAMP EXAM - LIDS
COMMENTS: NORMAL
COMMENTS: NORMAL

## 2022-08-08 ASSESSMENT — EXTERNAL EXAM - LEFT EYE: OS_EXAM: NORMAL

## 2022-08-08 ASSESSMENT — EXTERNAL EXAM - RIGHT EYE: OD_EXAM: NORMAL

## 2022-08-08 NOTE — PROGRESS NOTES
CC: restrictive esotropia right eye    Referring Provider: Dr. Zuniga      HPI:    Ernst Aleman 67 year old White male with a history of right and left eye pterygium surgery in 2018 followed by [facility].  About a year later (~2019) he noted double vision and had another procedure performed on the right eye.  He notes that this helped for a time (about 6-8 months) but has been dealing with recurrent diplopia for the past 2+ years.  Original surgeon was Dr. Parsons in [place], [place].  He was referred eventually to Dr. Zuniga who saw pt today (5/4/22) who noted restrictive esotropia stemming from the area of the right medial rectus.  He is referred to Dr. Ma for evaluation of possible release of scar tissue.  He gets intermittent binocular diplopia that bothers him most when driving or watching television.  He can hold his head in such a way that the double goes away.      Interval Hx: s/p repeat excision of pterygium with conjunctival autograft, amniotic membrane, mitomycin C right eye. Reports that the diplopia in far right gaze is improving over time. No diplopia in primary gaze. No flashes, floaters, curtains. Feels that his vision is very good. No foreign body sensation or eye pain.    POHx:  Pterygium right eye x 2 (outside ophthalmologist)  Pterygium excision left eye (outside ophthalmologist)   GABIIOL each eye   S/p repeat excision of pterygium with conjunctival autograft, amniotic membrane, mitomycin C OD 6/9/22 ([facility])    Family Hx of eye disease: none    Gtts Currenly Taking:  Artificial tears PRN each eye (NOT USING)    Allergies:  NKDA    Assessment:  # Restrictive esotropia 2/2 right eye prior nasal pterygium excision:   S/p repeat excision of pterygium with conjunctival autograft, amniotic membrane, mitomycin C OD 6/9/22  - Diplopia improving in far right gaze   - reviewed post-op precautions  - continue preservative free artificial tears     # Temporal Pterygium right eye:   - monitor.    Follow up  Cornea:  6 months, V,T, at next visit, call if problems sooner to clinic.    ALSO SEES:  Dr. Efrain Charles MD  Cornea Fellow  [facility]    Attending Physician Attestation:  Complete documentation of historical and exam elements from today's encounter can be found in the full encounter summary report (not reduplicated in this progress note).  I personally obtained the chief complaint(s) and history of present illness.  I confirmed and edited as necessary the review of systems, past medical/surgical history, family history, social history, and examination findings as documented by others; and I examined the patient myself.  I personally reviewed the relevant tests, images, and reports as documented above.  I formulated and edited as necessary the assessment and plan and discussed the findings and management plan with the patient and family. - Tino Ma MD

## (undated) DEVICE — BLADE KNIFE BEAVER MINI STR BEAVER6900

## (undated) DEVICE — EYE CANN IRR 27GA ANTERIOR CHAMBER 581280

## (undated) DEVICE — DRAPE STERI APERATURE 51X33" 1030

## (undated) DEVICE — GLOVE PROTEXIS MICRO 6.0  2D73PM60

## (undated) DEVICE — EYE KNIFE CRESCENT 55DEG 373807

## (undated) DEVICE — ADH TISSEEL FIBRIN SEALANT 2ML

## (undated) DEVICE — LINEN TOWEL PACK X5 5464

## (undated) DEVICE — PACK CATARACT CUSTOM ASC SEY15CPUMC

## (undated) DEVICE — EYE SHIELD PLASTIC

## (undated) DEVICE — GLOVE PROTEXIS MICRO 6.5  2D73PM65

## (undated) DEVICE — SOL WATER IRRIG 500ML BOTTLE 2F7113

## (undated) DEVICE — EYE PREP BETADINE 5% SOLUTION 30ML 0065-0411-30

## (undated) RX ORDER — LIDOCAINE HCL/PF 100 MG/5ML
SYRINGE (ML) INJECTION
Status: DISPENSED
Start: 2022-06-09

## (undated) RX ORDER — ACETAMINOPHEN 325 MG/1
TABLET ORAL
Status: DISPENSED
Start: 2022-06-09

## (undated) RX ORDER — PROPOFOL 10 MG/ML
INJECTION, EMULSION INTRAVENOUS
Status: DISPENSED
Start: 2022-06-09